# Patient Record
Sex: MALE | Employment: FULL TIME | URBAN - METROPOLITAN AREA
[De-identification: names, ages, dates, MRNs, and addresses within clinical notes are randomized per-mention and may not be internally consistent; named-entity substitution may affect disease eponyms.]

---

## 2023-03-01 ENCOUNTER — MEDICAL CORRESPONDENCE (OUTPATIENT)
Dept: HEALTH INFORMATION MANAGEMENT | Facility: CLINIC | Age: 63
End: 2023-03-01
Payer: COMMERCIAL

## 2023-10-23 ENCOUNTER — OFFICE VISIT (OUTPATIENT)
Dept: UROLOGY | Facility: CLINIC | Age: 63
End: 2023-10-23
Payer: COMMERCIAL

## 2023-10-23 VITALS
HEIGHT: 69 IN | DIASTOLIC BLOOD PRESSURE: 72 MMHG | WEIGHT: 181 LBS | BODY MASS INDEX: 26.81 KG/M2 | SYSTOLIC BLOOD PRESSURE: 125 MMHG | HEART RATE: 86 BPM

## 2023-10-23 DIAGNOSIS — I86.1 VARICOCELE: Primary | ICD-10-CM

## 2023-10-23 DIAGNOSIS — N20.0 KIDNEY STONE ON LEFT SIDE: ICD-10-CM

## 2023-10-23 PROCEDURE — 99203 OFFICE O/P NEW LOW 30 MIN: CPT | Performed by: STUDENT IN AN ORGANIZED HEALTH CARE EDUCATION/TRAINING PROGRAM

## 2023-10-23 RX ORDER — AMITRIPTYLINE HYDROCHLORIDE 10 MG/1
10 TABLET ORAL AT BEDTIME
COMMUNITY

## 2023-10-23 RX ORDER — LOSARTAN POTASSIUM 50 MG/1
50 TABLET ORAL DAILY
COMMUNITY

## 2023-10-23 ASSESSMENT — PAIN SCALES - GENERAL: PAINLEVEL: NO PAIN (0)

## 2023-10-23 NOTE — NURSING NOTE
Chief Complaint   Patient presents with    Varicocele    Kidney Stone Related      Pt states he is here for varicocele, pt states there is some discomfort. Pt states he also has a kidney stone that is bothering him on his back right side.    Kary Merlos, CMA

## 2023-10-23 NOTE — PROGRESS NOTES
Chief Complaint:   Varicocele           Consult or Referral:     Mr. Sebastian Clark is a 63 year old male seen at the request of Dr. Liu ref. provider found.         History of Present Illness:     Sebastian Clark is a 63 year old male being seen for bilateral varicocele.  Duration of problem: 2 years  Previous treatments: None yet     accompanied by his   Reviewed previous notes from Dr. Daron Nunes     Sebastian was referred to us by his primary care  He has known to have bilateral varicoceles for a long time but never has any surgery or any interventions for it  He tells me that he had some bleeding from his scrotum a few years ago and he thinks it is from the varicocele  He works as a  in a building and does not have to do heavy lifting  His pain worsens when he is doing heavy lifting or going out for strenuous exercise or run  He has completed his family           Past Medical History:   No past medical history on file.         Past Surgical History:   No past surgical history on file.         Medications     Current Outpatient Medications   Medication    amitriptyline (ELAVIL) 10 MG tablet    losartan (COZAAR) 50 MG tablet     No current facility-administered medications for this visit.            Family History:   No family history on file.         Social History:     Social History     Socioeconomic History    Marital status:      Spouse name: Not on file    Number of children: Not on file    Years of education: Not on file    Highest education level: Not on file   Occupational History    Not on file   Tobacco Use    Smoking status: Former     Types: Cigarettes    Smokeless tobacco: Never   Substance and Sexual Activity    Alcohol use: Not on file    Drug use: Not on file    Sexual activity: Not on file   Other Topics Concern    Not on file   Social History Narrative    Not on file     Social Determinants of Health     Financial  "Resource Strain: Not on file   Food Insecurity: Not on file   Transportation Needs: Not on file   Physical Activity: Not on file   Stress: Not on file   Social Connections: Not on file   Interpersonal Safety: Not on file   Housing Stability: Not on file            Allergies:   Shrimp         Review of Systems:  From intake questionnaire     Skin: negative  Eyes: negative  Ears/Nose/Throat: negative  Respiratory: No shortness of breath, dyspnea on exertion, cough, or hemoptysis  Cardiovascular: No chest pain or palpitations  Gastrointestinal: negative; no nausea/vomiting, constipation or diarrhea  Genitourinary: as per HPI  Musculoskeletal: negative  Neurologic: negative  Psychiatric: negative  Hematologic/Lymphatic/Immunologic: negative  Endocrine: negative         Physical Exam:     Patient is a 63 year old  male   Vitals: Blood pressure 125/72, pulse 86, height 1.75 m (5' 8.9\"), weight 82.1 kg (181 lb).  Constitutional: Body mass index is 26.81 kg/m .  Alert, no acute distress, oriented, conversant  Eyes: no scleral icterus; extraocular muscles intact, moist conjunctivae  Neck: trachea midline, no thyromegaly  Ears/nose/mouth: throat/mouth:normal, good dentition  Respiratory: no respiratory distress, or pursed lip breathing  Cardiovascular: pulses strong and intact; no obvious jugular venous distension present  Gastrointestinal: soft, nontender, no organomegaly or masses,   Lymphatics: No inguinal adenopathy  Musculoskeletal: extremities normal, no peripheral edema  Skin: no suspicious lesions or rashes  Neuro: Alert, oriented, speech and mentation normal  Psych: affect and mood normal, alert and oriented to person, place and time  Gait: Normal  : Bilateral varicocele, right more than left multiple hyperpigmented skin lesions in the bilateral scrotal skin which appears like fibromas      Labs and Pathology:    The following labs were reviewed by me and discussed with the patient:  Creatinine: Normal, " "0.85  Significant for No results found for: \"CR\"  No results found for: \"PSA\"          Imaging:    The following imaging exams were independently viewed and interpreted by me and discussed with patient:  CT Scan Abd/Pelvis: Abnormal: 1 mm sized calyceal tip calcification versus stone in the left upper pole               Assessment and Plan:     Varicocele  Bilateral varicocele palpable  Ordered ultrasound scrotum to confirm the diagnosis  Discussed about conservative  approach versus intervention for varicoceles  We discussed about athletic supporter's and how they can be helpful to alleviate pain Associated with varicocele  We also discussed about microscopic varicocelectomy and embolization for treatment of varicoceles  At this point in time Sebastian is agreeable to continue with conservative approach and would want to get the ultrasound done  US Testicular & Scrotum w Doppler Ltd; Future    Kidney stone on left side  Obstructing kidney stone on the left  Does not need further interventions  Discussed about stone preventive measures      Plan:  Scrotal ultrasound, we will update results  Follow-up as needed    Orders  Orders Placed This Encounter   Procedures    US Testicular & Scrotum w Doppler Ltd       Poncho Roque MD  [facility] [place]      ==========================    Additional Billing and Coding Information:  Review of external notes as documented above   Review of the result(s) of each unique test - creatinine, CT abdomen pelvis                16 minutes spent by me on the date of the encounter doing chart review, review of test results, interpretation of tests, patient visit, documentation, and use of the       ==========================  "

## 2023-10-23 NOTE — LETTER
10/23/2023       RE: Sebastian Clark  [address]  [place] [address]     Dear Colleague,    Thank you for referring your patient, Sebastian Clark, to the [facility] [place] [facility]. Please see a copy of my visit note below.          Chief Complaint:   Varicocele           Consult or Referral:     Mr. Sebastian Clark is a 63 year old male seen at the request of Dr. Liu ref. provider found.         History of Present Illness:     Sebastian Clark is a 63 year old male being seen for bilateral varicocele.  Duration of problem: 2 years  Previous treatments: None yet     accompanied by his   Reviewed previous notes from Dr. Daron Nunes     Sebastian was referred to us by his primary care  He has known to have bilateral varicoceles for a long time but never has any surgery or any interventions for it  He tells me that he had some bleeding from his scrotum a few years ago and he thinks it is from the varicocele  He works as a  in a building and does not have to do heavy lifting  His pain worsens when he is doing heavy lifting or going out for strenuous exercise or run  He has completed his family           Past Medical History:   No past medical history on file.         Past Surgical History:   No past surgical history on file.         Medications     Current Outpatient Medications   Medication    amitriptyline (ELAVIL) 10 MG tablet    losartan (COZAAR) 50 MG tablet     No current facility-administered medications for this visit.            Family History:   No family history on file.         Social History:     Social History     Socioeconomic History    Marital status:      Spouse name: Not on file    Number of children: Not on file    Years of education: Not on file    Highest education level: Not on file   Occupational History    Not on file  "  Tobacco Use    Smoking status: Former     Types: Cigarettes    Smokeless tobacco: Never   Substance and Sexual Activity    Alcohol use: Not on file    Drug use: Not on file    Sexual activity: Not on file   Other Topics Concern    Not on file   Social History Narrative    Not on file     Social Determinants of Health     Financial Resource Strain: Not on file   Food Insecurity: Not on file   Transportation Needs: Not on file   Physical Activity: Not on file   Stress: Not on file   Social Connections: Not on file   Interpersonal Safety: Not on file   Housing Stability: Not on file            Allergies:   Shrimp         Review of Systems:  From intake questionnaire     Skin: negative  Eyes: negative  Ears/Nose/Throat: negative  Respiratory: No shortness of breath, dyspnea on exertion, cough, or hemoptysis  Cardiovascular: No chest pain or palpitations  Gastrointestinal: negative; no nausea/vomiting, constipation or diarrhea  Genitourinary: as per HPI  Musculoskeletal: negative  Neurologic: negative  Psychiatric: negative  Hematologic/Lymphatic/Immunologic: negative  Endocrine: negative         Physical Exam:     Patient is a 63 year old  male   Vitals: Blood pressure 125/72, pulse 86, height 1.75 m (5' 8.9\"), weight 82.1 kg (181 lb).  Constitutional: Body mass index is 26.81 kg/m .  Alert, no acute distress, oriented, conversant  Eyes: no scleral icterus; extraocular muscles intact, moist conjunctivae  Neck: trachea midline, no thyromegaly  Ears/nose/mouth: throat/mouth:normal, good dentition  Respiratory: no respiratory distress, or pursed lip breathing  Cardiovascular: pulses strong and intact; no obvious jugular venous distension present  Gastrointestinal: soft, nontender, no organomegaly or masses,   Lymphatics: No inguinal adenopathy  Musculoskeletal: extremities normal, no peripheral edema  Skin: no suspicious lesions or rashes  Neuro: Alert, oriented, speech and mentation normal  Psych: affect and mood " "normal, alert and oriented to person, place and time  Gait: Normal  : Bilateral varicocele, right more than left multiple hyperpigmented skin lesions in the bilateral scrotal skin which appears like fibromas      Labs and Pathology:    The following labs were reviewed by me and discussed with the patient:  Creatinine: Normal, 0.85  Significant for No results found for: \"CR\"  No results found for: \"PSA\"          Imaging:    The following imaging exams were independently viewed and interpreted by me and discussed with patient:  CT Scan Abd/Pelvis: Abnormal: 1 mm sized calyceal tip calcification versus stone in the left upper pole               Assessment and Plan:     Varicocele  Bilateral varicocele palpable  Ordered ultrasound scrotum to confirm the diagnosis  Discussed about conservative  approach versus intervention for varicoceles  We discussed about athletic supporter's and how they can be helpful to alleviate pain Associated with varicocele  We also discussed about microscopic varicocelectomy and embolization for treatment of varicoceles  At this point in time Sebastian is agreeable to continue with conservative approach and would want to get the ultrasound done  US Testicular & Scrotum w Doppler [facility]; Future    Kidney stone on left side  Obstructing kidney stone on the left  Does not need further interventions  Discussed about stone preventive measures      Plan:  Scrotal ultrasound, we will update results  Follow-up as needed    Orders  Orders Placed This Encounter   Procedures    US Testicular & Scrotum w Doppler Ltd       Poncho Roque MD  [facility] [place]      ==========================    Additional Billing and Coding Information:  Review of external notes as documented above   Review of the result(s) of each unique test - creatinine, CT abdomen pelvis                16 minutes spent by me on the date of the encounter doing chart review, review of test results, interpretation " of tests, patient visit, documentation, and use of the       ==========================

## 2023-10-23 NOTE — PATIENT INSTRUCTIONS
Athletic supporters to be worn as needed  US scrotum to be done  Follow-up as needed  We will update results of the US once available.

## 2023-10-24 ENCOUNTER — HOSPITAL ENCOUNTER (OUTPATIENT)
Dept: ULTRASOUND IMAGING | Facility: CLINIC | Age: 63
Discharge: HOME | End: 2023-10-24
Attending: STUDENT IN AN ORGANIZED HEALTH CARE EDUCATION/TRAINING PROGRAM | Admitting: STUDENT IN AN ORGANIZED HEALTH CARE EDUCATION/TRAINING PROGRAM
Payer: COMMERCIAL

## 2023-10-24 DIAGNOSIS — I86.1 VARICOCELE: ICD-10-CM

## 2023-10-24 PROCEDURE — 76870 US EXAM SCROTUM: CPT

## 2023-11-06 ENCOUNTER — TELEPHONE (OUTPATIENT)
Dept: UROLOGY | Facility: CLINIC | Age: 63
End: 2023-11-06
Payer: COMMERCIAL

## 2023-11-06 NOTE — TELEPHONE ENCOUNTER
[facility] Call Center    Phone Message    May a detailed message be left on voicemail: no     Reason for Call: Other: Patient is calling to see if the clinic has gotten his ultrasound orders back. Patient had gotten call and was unsure of what it was for. Please call patient back to follow up.     Action Taken: Other: [facility]    Travel Screening: Not Applicable

## 2023-11-07 NOTE — TELEPHONE ENCOUNTER
Patient was informed of results of his US with a [place] interpretor. Patient will reach out to Dr. Segura's office to schedule a consultation for possible surgery. Patient was given number to schedule.     Ann Marie Moon LPN